# Patient Record
Sex: FEMALE | Race: WHITE
[De-identification: names, ages, dates, MRNs, and addresses within clinical notes are randomized per-mention and may not be internally consistent; named-entity substitution may affect disease eponyms.]

---

## 2020-01-03 ENCOUNTER — HOSPITAL ENCOUNTER (OUTPATIENT)
Dept: HOSPITAL 74 - FASU | Age: 50
LOS: 1 days | Discharge: HOME HEALTH | End: 2020-01-04
Attending: ORTHOPAEDIC SURGERY
Payer: COMMERCIAL

## 2020-01-03 VITALS — BODY MASS INDEX: 36.8 KG/M2

## 2020-01-03 DIAGNOSIS — M17.11: Primary | ICD-10-CM

## 2020-01-03 PROCEDURE — 8E0YXBZ COMPUTER ASSISTED PROCEDURE OF LOWER EXTREMITY: ICD-10-PCS | Performed by: ORTHOPAEDIC SURGERY

## 2020-01-03 PROCEDURE — 27446 REVISION OF KNEE JOINT: CPT

## 2020-01-03 PROCEDURE — 0SRC0L9 REPLACEMENT OF RIGHT KNEE JOINT WITH MEDIAL UNICONDYLAR SYNTHETIC SUBSTITUTE, CEMENTED, OPEN APPROACH: ICD-10-PCS | Performed by: ORTHOPAEDIC SURGERY

## 2020-01-03 PROCEDURE — 8E0Y0CZ ROBOTIC ASSISTED PROCEDURE OF LOWER EXTREMITY, OPEN APPROACH: ICD-10-PCS | Performed by: ORTHOPAEDIC SURGERY

## 2020-01-03 PROCEDURE — 20985 CPTR-ASST DIR MS PX: CPT

## 2020-01-03 RX ADMIN — CEFAZOLIN SODIUM SCH MLS/HR: 2 SOLUTION INTRAVENOUS at 23:18

## 2020-01-03 RX ADMIN — DOCUSATE SODIUM,SENNOSIDES SCH TABLET: 50; 8.6 TABLET, FILM COATED ORAL at 21:49

## 2020-01-03 RX ADMIN — CEFAZOLIN SODIUM SCH MLS/HR: 2 SOLUTION INTRAVENOUS at 15:54

## 2020-01-03 RX ADMIN — PANTOPRAZOLE SODIUM SCH MG: 40 TABLET, DELAYED RELEASE ORAL at 12:54

## 2020-01-03 RX ADMIN — MULTIVITAMIN TABLET SCH TAB: TABLET at 12:54

## 2020-01-03 RX ADMIN — CELECOXIB ONE MG: 200 CAPSULE ORAL at 07:10

## 2020-01-03 RX ADMIN — FERROUS SULFATE TAB EC 324 MG (65 MG FE EQUIVALENT) SCH MG: 324 (65 FE) TABLET DELAYED RESPONSE at 12:54

## 2020-01-03 RX ADMIN — ACETAMINOPHEN SCH MG: 325 TABLET ORAL at 18:16

## 2020-01-03 RX ADMIN — ACETAMINOPHEN SCH MG: 325 TABLET ORAL at 23:17

## 2020-01-03 RX ADMIN — DOCUSATE SODIUM,SENNOSIDES SCH TABLET: 50; 8.6 TABLET, FILM COATED ORAL at 12:55

## 2020-01-03 RX ADMIN — ACETAMINOPHEN SCH MG: 325 TABLET ORAL at 12:53

## 2020-01-03 NOTE — SPEC
DATE OF OPERATION:  01/03/2020

 

PREOPERATIVE DIAGNOSIS:  Degenerative joint disease, right knee.

 

POSTOPERATIVE DIAGNOSIS:  Degenerative joint disease, right knee.

 

PROCEDURE:  Right medial unicompartmental knee replacement with robotic-assisted

navigation (MAKOplasty) and patelloplasty.

 

SURGICAL ATTENDING:  Elier Wolfe MD

 

ANESTHESIA:  Regional and spinal.

 

CLOSURE:  Medial unicompartmental MISHEL components with a 4 femur, 3 tibia, and an 8

polyethylene; No. 1 Vicryl, fascia; 0 and 2-0, subcutaneous; 3-0 Monocryl

subcuticular with skin glue; 4-0 undyed Vicryl for pin sites.

 

ESTIMATED BLOOD LOSS:  Negligible.

 

TOURNIQUET TIME:  Approximately 24 minutes.

 

COMPLICATIONS:  None.

 

CONDITION:  To recovery in stable condition.

 

DESCRIPTION OF OPERATIVE PROCEDURE:  Patient was taken to the operating room on

January 3, 2020.  Spinal and regional anesthesia was administered by the

anesthesiologist.  IV Kefzol and TXA were administered prophylactically prior to the

case.  A well-padded pneumatic tourniquet was placed on the right proximal thigh.  

The right lower extremity was prepped and draped in the usual sterile fashion.  A 6-

to 8-cm longitudinal incision over the medial side of the patella from mid patella to

the tibial tubercle was incised and was deepened using Bovie cautery.  An arthrotomy

was then made just medial to the patellar tendon and the patella.  Subperiosteal

dissection was done on the anteromedial proximal tibia all the way back to the MCL. 

Partial fat pad excision was performed, exposing the medial compartment.  Checkpoint

was malleable at both the femur and the tibia.  Using 2 stab incisions in the femur 1

handbreadth above the patella on the femur and 2 stab incisions 1 handbreadth below

the tibial tubercle on the tibia, 2 threaded pins were drilled in parallel fashion

from anterior to posterior, going through the proximal cortex and engaging the 2nd

but not through the 2nd cortex.  To these threaded pins were fastened navigation

rays, 1 on the femur and 1 on the tibia.  The knee was then registered with the

navigation device with the center of the rotation of the hip, medial and lateral

malleoli, and multiple points both on the femur and on the tibia.  Excellent

registration of less than 0.5 mm was obtained on both to ensure adequate

registration.  The navigation device ensured us to "pop the bubbles" both on the

femur and the tibia and that was performed and passed registration.

 

The knee was then thoroughly inspected to remove all osteophytes both on the femur

and the tibia.  Also, osteophytes on the trochlea and on the surface of the patella

were removed as well.  The knee was then stressed with valgus stress at 0, 30, 60,

90, and 120 degrees of flexion.  This propagated a looseness/tightness graft.  The

virtual positions of the components were then optimized to ensure an excellent graft.

 The tracking also was optimized by manipulating the virtual position to ensure that

the femoral component articulated with the central portion of the tibial component. 

The robot was then brought into the field and was registered.  The robot was used to

bur the bone on both the femur and the tibia as to the specifications of the

components.  The trial components were then applied on both the femur and the tibia

with an appropriate polyethylene insert.  The knee was taken through a range of

motion and found to have full extension, full flexion, with excellent stability. 

Stressing the graft revealed an excellent looseness/tightness graft with the trial

components in place.

 

The trial components were removed.  The knee was thoroughly irrigated with a copious

amount of antibiotic irrigation.  The real components were then cemented in using

modern generation cement techniques with antibiotic cement and pressurization.  After

the cement was hardened, the knee was thoroughly inspected to remove out all excess

cement.  The real polyethylene insert was then clipped into place.  Range of motion

and stability were again assessed to be as they were with the trials.  At this time,

the pins and the checkpoints were removed.  The knee was again thoroughly irrigated. 

The arthrotomy was closed with No. 1 Vicryl, 0 and 2-0 subcutaneous, and 3-0 Monocryl

subcuticular with skin glue for the skin, 4-0 undyed Vicryl for the pin sites. 

Sterile pressure dressing was placed over the knee.  Patient awakened from anesthesia

and transferred to recovery in stable condition.  No complications.  Estimated blood

loss negligible.  X-rays postoperatively revealed excellent position of the

components.

 

 

DWIGHT [id number]

DD: 01/03/2020 10:27

DT: 01/03/2020 11:02

Job #:  [id number]

## 2020-01-04 VITALS — SYSTOLIC BLOOD PRESSURE: 113 MMHG | TEMPERATURE: 98.5 F | DIASTOLIC BLOOD PRESSURE: 49 MMHG | HEART RATE: 76 BPM

## 2020-01-04 RX ADMIN — PANTOPRAZOLE SODIUM SCH MG: 40 TABLET, DELAYED RELEASE ORAL at 09:22

## 2020-01-04 RX ADMIN — FERROUS SULFATE TAB EC 324 MG (65 MG FE EQUIVALENT) SCH MG: 324 (65 FE) TABLET DELAYED RESPONSE at 09:21

## 2020-01-04 RX ADMIN — CELECOXIB ONE: 200 CAPSULE ORAL at 07:03

## 2020-01-04 RX ADMIN — DOCUSATE SODIUM,SENNOSIDES SCH TABLET: 50; 8.6 TABLET, FILM COATED ORAL at 09:21

## 2020-01-04 RX ADMIN — MULTIVITAMIN TABLET SCH TAB: TABLET at 09:22

## 2020-01-04 RX ADMIN — ACETAMINOPHEN SCH MG: 325 TABLET ORAL at 06:04

## 2024-11-21 NOTE — HP
[facility]





- Chief Complaint


Chief Complaint: right knee pain





- Past Medical History


Allergies/Adverse Reactions: 


 Allergies











Allergy/AdvReac Type Severity Reaction Status Date / Time


 


No Known Allergies Allergy   Verified 01/03/20 06:52











...LMP Comment: 9/29/2019





- Current Medications


Current Medications: 


 Home Medications











 Medication  Instructions  Recorded


 


Ferrous Sulfate [Iron] 325 mg PO DAILY 12/23/19


 


Gluc Lees/Chondro Lees A/Vit C/Mn 1 each PO DAILY 12/23/19





[Glucosamine-Chondroitin Caps]  














Satellite Physical Exam





- Physical Examination


Vital Signs: 


 Vital Signs











 Period  Temp  Pulse  Resp  BP Sys/Riddle  Pulse Ox


 


 Last 24 Hr  98.5 F  83  18  140/86  97-97











General Appearance: Well Nourished, Well Developed, Alert & Oriented x3


ENT: Clear


Lung: Normal air movement


Extremities: Other (right knee- + swelling, + ttp medially, decr rom, nvi, 

xrays show grade 4 medial djd)


Neurological: Intact, Alert, Oriented





Satellite Impression/Plan





- Impression/Plan


Impression: right knee medial djd


Operative Procedure: right medial renate ukr


Date to be Performed: 01/03/20
I was physically present and participated in this delivery.